# Patient Record
Sex: MALE | Race: WHITE | Employment: OTHER | URBAN - METROPOLITAN AREA
[De-identification: names, ages, dates, MRNs, and addresses within clinical notes are randomized per-mention and may not be internally consistent; named-entity substitution may affect disease eponyms.]

---

## 2018-04-05 ENCOUNTER — HOSPITAL ENCOUNTER (OUTPATIENT)
Dept: PHYSICAL THERAPY | Age: 38
Discharge: HOME | End: 2018-04-05
Payer: COMMERCIAL

## 2018-04-05 PROCEDURE — 97140 MANUAL THERAPY 1/> REGIONS: CPT

## 2018-04-05 PROCEDURE — 97161 PT EVAL LOW COMPLEX 20 MIN: CPT
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
## 2018-04-05 NOTE — PROGRESS NOTES
PHYSICAL THERAPY - DAILY TREATMENT NOTE    Patient Name: Verónica Maza        Date: 2018  : 1980   YES Patient  Verified  Visit #:     Insurance: Payor: Rocael Mendoza / Plan: Luis Lipscomb PT / Product Type: Commerical /      In time: 8:30 Out time: 9:20   Total Treatment Time: 50     Medicare Time Tracking (below)   Total Timed Codes (min):  - 1:1 Treatment Time:  -     TREATMENT AREA =  C/S    SUBJECTIVE    Pain Level (on 0 to 10 scale): 4 / 10   Medication Changes/New allergies or changes in medical history, any new surgeries or procedures? NO    If yes, update Summary List   Subjective Functional Status/Changes:  []  No changes reported      see eval          OBJECTIVE    Modality rationale:  -    min [] Estim, type:                                          []  att     []  unatt     []  w/US     []  w/ice    []  w/heat    min []  Mechanical Traction: type/lbs                                               []  pro   []  sup   []  int   []  cont    min []  Ultrasound, settings/location:      min []  Iontophoresis:  []  take home patch w/ dexamethazone    min                                []  in clinic w/ dexamethazone    min []  Ice     []  Heat     position:     min []  Other:      - min Therapeutic Exercise:  [x]  See flow sheet   []  Other:      []  Added:     to improve (function):    []  Changed:     to improve (function):       min Therapeutic Activity:      15 min Manual Therapy: Upper T/S L rotational mobs in sitting. GD IV PA mobs to upper T/S and lower C/S, passive sustained rotation, trap stretch and levaotr stretch, passive rep extension, DTM to arch      min Gait Training:       min Patient Education:  YES  Reviewed HEP   []  Progressed/Changed HEP based on:         Other Objective/Functional Measures:    See eval     Post Treatment Pain Level (on 0 to 10) scale:   1  / 10     ASSESSMENT    []  See Progress Note/Recertification   Patient will continue to benefit from skilled therapy to address remaining functional deficits: see eval   Progress toward goals / Updated goals:    -     PLAN    [x]  Upgrade activities as tolerated YES Continue plan of care   []  Discharge due to :    []  Other:      Therapist: Zenaida Peña PT    Date: 4/5/2018 Time: 9:48 AM

## 2018-04-10 ENCOUNTER — HOSPITAL ENCOUNTER (OUTPATIENT)
Dept: PHYSICAL THERAPY | Age: 38
Discharge: HOME | End: 2018-04-10
Payer: COMMERCIAL

## 2018-04-10 PROCEDURE — 97140 MANUAL THERAPY 1/> REGIONS: CPT

## 2018-04-10 PROCEDURE — 97110 THERAPEUTIC EXERCISES: CPT

## 2018-04-10 NOTE — PROGRESS NOTES
PHYSICAL THERAPY - DAILY TREATMENT NOTE    Patient Name: Mitchell Monreal        Date: 4/10/2018  : 1980    Patient  Verified: YES  Visit #:   2   of   12  Insurance: Payor: Elina Frank / Plan: [address] PT / Product Type: Commerical /      In time: 3:30 Out time: 4:35   Total Treatment Time: 65     Medicare Time Tracking (below)   Total Timed Codes (min):  - 1:1 Treatment Time:  -     TREATMENT AREA/ DIAGNOSIS = Neck pain [M54.2]  Back pain [M54.9]  Heel pain [M79.673]    SUBJECTIVE  Pain Level (on 0 to 10 scale):  4  / 10   Medication Changes/New allergies or changes in medical history, any new surgeries or procedures?     NO    If yes, update Summary List   Subjective Functional Status/Changes:  []  No changes reported     Functional improvement    Functional limitation I was really sore after the first visit      OBJECTIVE  Modalities Rationale:     decrease edema, decrease inflammation and decrease pain to improve patient's ability to perform ADLs without pain   min [] Estim, type/location:                                      []  att     []  unatt     []  w/US     []  w/ice    []  w/heat    min []  Mechanical Traction: type/lbs                   []  pro   []  sup   []  int   []  cont    []  before manual    []  after manual    min []  Ultrasound, settings/location:      min []  Iontophoresis w/ dexamethasone, location:                                               []  take home patch       []  in clinic   10 min [x]  Ice     []  Heat    location/position: R heel/neck    min []  Vasopneumatic Device, press/temp:     min []  Other:    [x] Skin assessment post-treatment (if applicable):    [x]  intact    [x]  redness- no adverse reaction     []redness  adverse reaction:        30 min Manual Therapy: DTM to C/S, PROM, GD IV PA mobs to C/S and T/S, DTM to Calf, passive calf stretch and CFM to achilles insertion    Rationale:      decrease pain, increase ROM and increase tissue extensibility to improve patient's ability to perform ADLs without pain    25 min Therapeutic Exercise:  [x]  See flow sheet   Rationale:      increase ROM and increase strength to improve the patients ability to perform ADLs without pain          min Patient Education:  Yes    [x] Reviewed HEP   []  Progressed/Changed HEP based on: Other Objective/Functional Measures:    Pt with upper T/s soreness after the las visit, added ice to post treatment   Post Treatment Pain Level (on 0 to 10) scale:   3  / 10     ASSESSMENT  Assessment/Changes in Function:     hypomobile T/S     []  See Progress Note/Recertification   Patient will continue to benefit from skilled PT services to modify and progress therapeutic interventions, address functional mobility deficits, address ROM deficits, address strength deficits, analyze and address soft tissue restrictions, analyze and cue movement patterns, analyze and modify body mechanics/ergonomics and assess and modify postural abnormalities to attain remaining goals.    Progress toward goals / Updated goals:    -     PLAN  [x]  Upgrade activities as tolerated YES Continue plan of care   []  Discharge due to :    []  Other:      Therapist: Janey Bejarano PT    Date: 4/10/2018 Time: 4:10 PM        Future Appointments  Date Time Provider Sánchez Rodriguez   4/12/2018 11:00 AM Janey Bejarano PT REHAB CENTER AT [facility]   4/17/2018 11:30 AM Janey Bejarano PT REHAB CENTER AT [facility]   4/19/2018 11:00 AM Janey Bejarano PT REHAB CENTER AT [facility]   4/24/2018 11:00 AM Janey Bejarano PT REHAB CENTER AT [facility]   4/26/2018 11:00 AM Janey Bejarano PT REHAB CENTER AT [facility]

## 2018-04-12 ENCOUNTER — HOSPITAL ENCOUNTER (OUTPATIENT)
Dept: PHYSICAL THERAPY | Age: 38
Discharge: HOME | End: 2018-04-12
Payer: COMMERCIAL

## 2018-04-12 PROCEDURE — 97110 THERAPEUTIC EXERCISES: CPT

## 2018-04-12 PROCEDURE — 97140 MANUAL THERAPY 1/> REGIONS: CPT

## 2018-04-12 NOTE — PROGRESS NOTES
PHYSICAL THERAPY - DAILY TREATMENT NOTE    Patient Name: Jose Angel Carrasco        Date: 2018  : 1980    Patient  Verified: YES  Visit #:   3   of   12  Insurance: Payor: Reyes Daunt / Plan: [address] [place] [address] Rd PT / Product Type: Commerical /      In time: 10:45 Out time: 11:30   Total Treatment Time: 45     Medicare Time Tracking (below)   Total Timed Codes (min):  - 1:1 Treatment Time:  -     TREATMENT AREA/ DIAGNOSIS = Neck pain [M54.2]  Back pain [M54.9]  Heel pain [M79.673]    SUBJECTIVE  Pain Level (on 0 to 10 scale):  2-3  / 10   Medication Changes/New allergies or changes in medical history, any new surgeries or procedures? NO    If yes, update Summary List   Subjective Functional Status/Changes:  []  No changes reported     Pt reports that his calf and foot are more sore today than his neck. Pt states he will be walking around a lot this weekend when he goes out of town. OBJECTIVE    25 min Manual Therapy: DTM to c/s and t/s musculature. PA mobs to t/s in prone. STM to gastroc/soleus complex and cross friction to achilles tendon. Rationale:      increase tissue extensibility and decrease trigger points to improve patient's ability to perform ADLs without pain      20 min Therapeutic Exercise:  [x]  See flow sheet   Rationale:      increase strength and improve coordination to improve the patients ability to perform ADLs without pain            min Patient Education:  Yes    [x] Reviewed HEP   []  Progressed/Changed HEP based on: Other Objective/Functional Measures:    Pt had no increase in pain during treatment session  Pt had no radicular pain today. Initiated shoulder extension with theraband   Post Treatment Pain Level (on 0 to 10) scale:   1-2  / 10     ASSESSMENT  Assessment/Changes in Function:     Pt showing progress based on improved mobility in his thoracic spine post treatment. Pt still having pain in his heel with walking.       []  See Progress Note/Recertification Patient will continue to benefit from skilled PT services to modify and progress therapeutic interventions, address functional mobility deficits, address strength deficits, analyze and address soft tissue restrictions, analyze and cue movement patterns, analyze and modify body mechanics/ergonomics and assess and modify postural abnormalities to attain remaining goals. Progress toward goals / Updated goals:    Continue with current treatment plan progressing as the patient can tolerate.       PLAN  [x]  Upgrade activities as tolerated YES Continue plan of care   []  Discharge due to :    []  Other:      Therapist: Leland Small    Date: 4/12/2018 Time: 11:27 AM        Future Appointments  Date Time Provider Sánchez Rodriguez   4/17/2018 11:30 AM Susie Voss PT REHAB CENTER AT [facility]   4/19/2018 11:00 AM Susie Voss PT REHAB CENTER AT [facility]   4/24/2018 11:00 AM Susie Voss PT REHAB CENTER AT [facility]   4/26/2018 11:00 AM Susie Voss PT REHAB CENTER AT [facility]

## 2018-04-17 ENCOUNTER — HOSPITAL ENCOUNTER (OUTPATIENT)
Dept: PHYSICAL THERAPY | Age: 38
Discharge: HOME | End: 2018-04-17
Payer: COMMERCIAL

## 2018-04-17 PROCEDURE — 97110 THERAPEUTIC EXERCISES: CPT

## 2018-04-17 PROCEDURE — 97140 MANUAL THERAPY 1/> REGIONS: CPT

## 2018-04-17 NOTE — PROGRESS NOTES
PHYSICAL THERAPY - DAILY TREATMENT NOTE    Patient Name: Asia Calderón        Date: 2018  : 1980    Patient  Verified: YES  Visit #:      12  Insurance: Payor: Criselda Belle / Plan: [address] [place] [address] [facility] / Product Type: Commerical /      In time: 11:25 Out time: 12:10   Total Treatment Time: 45     Medicare Time Tracking (below)   Total Timed Codes (min):  - 1:1 Treatment Time:  -     TREATMENT AREA/ DIAGNOSIS = Neck pain [M54.2]  Back pain [M54.9]  Heel pain [M79.673]    SUBJECTIVE  Pain Level (on 0 to 10 scale):  0  / 10   Medication Changes/New allergies or changes in medical history, any new surgeries or procedures? NO    If yes, update Summary List   Subjective Functional Status/Changes:  []  No changes reported     Pt reports that he is feeling much better and didn't think he would make this much progress this fast.      OBJECTIVE    15 min Manual Therapy: DTM to U/T, rhomboids, c/s musculature. DTM to R gastroc/soleus complex   Rationale:      increase tissue extensibility and decrease trigger points to improve patient's ability to perform ADLs without pain      30 min Therapeutic Exercise:  [x]  See flow sheet   Rationale:      increase strength, improve coordination and increase proprioception to improve the patients ability to perform ADLs without pain          min Patient Education:  Yes    [x] Reviewed HEP   []  Progressed/Changed HEP based on: Other Objective/Functional Measures:    Pt had no increase in pain during treatment session  Pt has full ROM of C/S     Post Treatment Pain Level (on 0 to 10) scale:   0  / 10     ASSESSMENT  Assessment/Changes in Function:     Pt is showing great improvement based on achieving full ROM and decrease pain with activities.  Pt still has some soft tissue restrictions in the R U/T but are not causing symptoms     []  See Progress Note/Recertification   Patient will continue to benefit from skilled PT services to modify and progress therapeutic interventions, address functional mobility deficits, address ROM deficits, address strength deficits, analyze and address soft tissue restrictions, analyze and cue movement patterns, analyze and modify body mechanics/ergonomics and assess and modify postural abnormalities to attain remaining goals. Progress toward goals / Updated goals:    Continue with current treatment plan and progress as tolerated.       PLAN  [x]  Upgrade activities as tolerated YES Continue plan of care   []  Discharge due to :    []  Other:      Therapist: Mariano Rosales    Date: 4/17/2018 Time: 1:45 PM        Future Appointments  Date Time Provider Sánchez Rodriguez   4/19/2018 11:00 AM David Soto PT REHAB CENTER AT [facility]   4/24/2018 11:00 AM David Soto PT REHAB CENTER AT [facility]   4/26/2018 11:00 AM David Soto PT REHAB CENTER AT [facility]

## 2018-04-19 ENCOUNTER — HOSPITAL ENCOUNTER (OUTPATIENT)
Dept: PHYSICAL THERAPY | Age: 38
Discharge: HOME | End: 2018-04-19
Payer: COMMERCIAL

## 2018-04-19 PROCEDURE — 97110 THERAPEUTIC EXERCISES: CPT

## 2018-04-19 PROCEDURE — 97140 MANUAL THERAPY 1/> REGIONS: CPT

## 2018-04-19 NOTE — PROGRESS NOTES
PHYSICAL THERAPY - DAILY TREATMENT NOTE    Patient Name: Ria Cerda        Date: 2018  : 1980    Patient  Verified: YES  Visit #:   5   of   12  Insurance: Payor: Levi Junior / [facility]: [address] [address] [facility] / Product Type: Commerical /      In time: 11:00 Out time: 11:55   Total Treatment Time: 55     Medicare Time Tracking (below)   Total Timed Codes (min):  - 1:1 Treatment Time:  -     TREATMENT AREA/ DIAGNOSIS = Neck pain [M54.2]  Back pain [M54.9]  Heel pain [M79.673]    SUBJECTIVE  Pain Level (on 0 to 10 scale):  2  / 10   Medication Changes/New allergies or changes in medical history, any new surgeries or procedures? NO    If yes, update Summary List   Subjective Functional Status/Changes:  []  No changes reported     Pt states that there isnt much change since last time. He is a little sore in his neck and calves. OBJECTIVE  Modalities Rationale:     decrease inflammation and decrease pain to improve patient's ability to perform ADLs without pain     min [] Estim, type/location:                                      []  att     []  unatt     []  w/US     []  w/ice    []  w/heat    min []  Mechanical Traction: type/lbs                   []  pro   []  sup   []  int   []  cont    []  before manual    []  after manual    min []  Ultrasound, settings/location:      min []  Iontophoresis w/ dexamethasone, location:                                               []  take home patch       []  in clinic   10 min [x]  Ice     []  Heat    location/position:     min []  Vasopneumatic Device, press/temp:     min []  Other:    [] Skin assessment post-treatment (if applicable):    []  intact    []  redness- no adverse reaction     []redness  adverse reaction:        25 min Manual Therapy: DTM to c/s musculature. PA mobs in prone to the thoracic spine.  DTM to gastroc/soleus complex   Rationale:      decrease pain, increase tissue extensibility and decrease trigger points to improve patient's ability to perform ADLs without pain      20 min Therapeutic Exercise:  [x]  See flow sheet   Rationale:      increase strength, improve coordination and increase proprioception to improve the patients ability to perform ADLs without pain            min Patient Education:  Yes    [x] Reviewed HEP   []  Progressed/Changed HEP based on: Other Objective/Functional Measures:    Pt had no increase in pain during treatment session  Pt had full ROM in the c/s post treatment   Initiated theraband horizontal abduction   Post Treatment Pain Level (on 0 to 10) scale:   0  / 10     ASSESSMENT  Assessment/Changes in Function:     Pt benefited from manual therapy based on improved tissue mobility post treatment. Pt still shows some decreases scapular stability based on anterior tilting of the scapula during exercises. []  See Progress Note/Recertification   Patient will continue to benefit from skilled PT services to modify and progress therapeutic interventions, address functional mobility deficits, address ROM deficits, address strength deficits, analyze and address soft tissue restrictions, analyze and cue movement patterns and analyze and modify body mechanics/ergonomics to attain remaining goals. Progress toward goals / Updated goals:    Continue with current treatment and progress as tolerate.       PLAN  [x]  Upgrade activities as tolerated YES Continue plan of care   []  Discharge due to :    []  Other:      Therapist: Devin Bowie    Date: 4/19/2018 Time: 11:52 AM        Future Appointments  Date Time Provider Sánchez Rodriguez   4/24/2018 11:00 AM Lucila Cadet PT REHAB CENTER AT [facility]   4/26/2018 11:00 AM Lucila Cadet PT REHAB CENTER AT [facility]

## 2018-04-24 ENCOUNTER — HOSPITAL ENCOUNTER (OUTPATIENT)
Dept: PHYSICAL THERAPY | Age: 38
Discharge: HOME | End: 2018-04-24
Payer: COMMERCIAL

## 2018-04-24 PROCEDURE — 97140 MANUAL THERAPY 1/> REGIONS: CPT

## 2018-04-24 PROCEDURE — 97110 THERAPEUTIC EXERCISES: CPT

## 2018-04-24 NOTE — PROGRESS NOTES
PHYSICAL THERAPY - DAILY TREATMENT NOTE    Patient Name: Herman Dodge        Date: 2018  : 1980    Patient  Verified: YES  Visit #:   6   of   12  Insurance: Payor: Karla Reyes / Plan:  [address] PT / Product Type: Commerical /      In time: 11 Out time: 11:40   Total Treatment Time: 40     Medicare Time Tracking (below)   Total Timed Codes (min):  - 1:1 Treatment Time:  -     TREATMENT AREA/ DIAGNOSIS = Neck pain [M54.2]  Back pain [M54.9]  Heel pain [M79.673]    SUBJECTIVE  Pain Level (on 0 to 10 scale):  3  / 10   Medication Changes/New allergies or changes in medical history, any new surgeries or procedures? NO    If yes, update Summary List   Subjective Functional Status/Changes:  []  No changes reported     Functional improvement my neck is getting better   Functional limitation my heel still hurts about the same      OBJECTIVE  Modalities Rationale:     decrease edema, decrease inflammation and decrease pain to improve patient's ability to perform ADLs without pain   min [] Estim, type/location:                                      []  att     []  unatt     []  w/US     []  w/ice    []  w/heat    min []  Mechanical Traction: type/lbs                   []  pro   []  sup   []  int   []  cont    []  before manual    []  after manual    min []  Ultrasound, settings/location:      min []  Iontophoresis w/ dexamethasone, location:                                               []  take home patch       []  in clinic   10 min [x]  Ice     []  Heat    location/position:     min []  Vasopneumatic Device, press/temp:     min []  Other:    [x] Skin assessment post-treatment (if applicable):    [x]  intact    [x]  redness- no adverse reaction     []redness  adverse reaction:        15 min Manual Therapy: DTM to C/S, GD IV PA mos to upper T/S and lower C/S, PROM, rep ext. CFM to achilles insertion R.     Rationale:      decrease pain, increase ROM and increase tissue extensibility to improve patient's ability to perform ADLs without pain    15 min Therapeutic Exercise:  [x]  See flow sheet   Rationale:      increase ROM and increase strength to improve the patients ability to perform ADLs without pain          min Patient Education:  Yes    [x] Reviewed HEP   []  Progressed/Changed HEP based on: Other Objective/Functional Measures:    Pt with improving neck pain  Tenderness with PA mobs to C7 region  continued   Post Treatment Pain Level (on 0 to 10) scale:   0  / 10     ASSESSMENT  Assessment/Changes in Function:     Less neck pian and full ROM     []  See Progress Note/Recertification   Patient will continue to benefit from skilled PT services to modify and progress therapeutic interventions, address functional mobility deficits, address ROM deficits, address strength deficits, analyze and address soft tissue restrictions, analyze and cue movement patterns, analyze and modify body mechanics/ergonomics and assess and modify postural abnormalities to attain remaining goals.    Progress toward goals / Updated goals:    Improved neck ROM and low pain levels     PLAN  [x]  Upgrade activities as tolerated YES Continue plan of care   []  Discharge due to :    []  Other:      Therapist: Pamela Amin PT    Date: 4/24/2018 Time: 2:49 PM        Future Appointments  Date Time Provider Sánchez Rodriguez   4/26/2018 11:00 AM Pamela Amin, PT REHAB CENTER AT [facility]

## 2018-04-26 ENCOUNTER — HOSPITAL ENCOUNTER (OUTPATIENT)
Dept: PHYSICAL THERAPY | Age: 38
Discharge: HOME | End: 2018-04-26
Payer: COMMERCIAL

## 2018-04-26 PROCEDURE — 97110 THERAPEUTIC EXERCISES: CPT

## 2018-04-26 PROCEDURE — 97140 MANUAL THERAPY 1/> REGIONS: CPT

## 2018-04-26 NOTE — PROGRESS NOTES
PHYSICAL THERAPY - DAILY TREATMENT NOTE    Patient Name: Lorenzo Findhannah        Date: 2018  : 1980    Patient  Verified: YES  Visit #:      of   12  Insurance: Payor: [employer] / Plan: [address] PT / Product Type: Commerical /      In time: 11:10 Out time: 11:55   Total Treatment Time: 45     Medicare Time Tracking (below)   Total Timed Codes (min):  - 1:1 Treatment Time:  -     TREATMENT AREA/ DIAGNOSIS = Neck pain [M54.2]  Back pain [M54.9]  Heel pain [M79.673]    SUBJECTIVE  Pain Level (on 0 to 10 scale):  2  / 10   Medication Changes/New allergies or changes in medical history, any new surgeries or procedures? NO    If yes, update Summary List   Subjective Functional Status/Changes:  []  No changes reported     Functional improvement the neck is feeling better   Functional limitation still some heel pain      OBJECTIVE  Modalities Rationale:     decrease edema, decrease inflammation and decrease pain to improve patient's ability to perform ADLs without pain   min [] Estim, type/location:                                      []  att     []  unatt     []  w/US     []  w/ice    []  w/heat    min []  Mechanical Traction: type/lbs                   []  pro   []  sup   []  int   []  cont    []  before manual    []  after manual    min []  Ultrasound, settings/location:      min []  Iontophoresis w/ dexamethasone, location:                                               []  take home patch       []  in clinic   10 min [x]  Ice     []  Heat    location/position: R heel and C/S    min []  Vasopneumatic Device, press/temp:     min []  Other:    [x] Skin assessment post-treatment (if applicable):    [x]  intact    [x]  redness- no adverse reaction     []redness  adverse reaction:        15 min Manual Therapy: DTM to C/S, GD IV PA mobs to upper T/S and C/S, B trap stretch, passive sustained rotation, passive rep extension.   CFM to achilles, passive calf stretch and AP mobs to TC joint    Rationale: decrease pain, increase ROM and increase tissue extensibility to improve patient's ability to perform ADLs without pain    20 min Therapeutic Exercise:  [x]  See flow sheet   Rationale:      increase ROM and increase strength to improve the patients ability to perform ADLs without pain          min Patient Education:  Yes    [x] Reviewed HEP   []  Progressed/Changed HEP based on: Other Objective/Functional Measures:    Pt with limited C/S pain, tender in his C/S T/S junction  Full PROM   in achilles insertion  Still jamming in ant R TC joint with passive soleus stretch   Post Treatment Pain Level (on 0 to 10) scale:   0  / 10     ASSESSMENT  Assessment/Changes in Function:     Improved C/S ROM and less pain   []  See Progress Note/Recertification   Patient will continue to benefit from skilled PT services to modify and progress therapeutic interventions, address functional mobility deficits, address ROM deficits, address strength deficits, analyze and address soft tissue restrictions, analyze and cue movement patterns, analyze and modify body mechanics/ergonomics and assess and modify postural abnormalities to attain remaining goals. Progress toward goals / Updated goals:    Less pain     PLAN  [x]  Upgrade activities as tolerated YES Continue plan of care   []  Discharge due to :    []  Other:      Therapist: Zenaida Peña PT    Date: 4/26/2018 Time: 12:03 PM        No future appointments.

## 2018-05-03 ENCOUNTER — HOSPITAL ENCOUNTER (OUTPATIENT)
Dept: PHYSICAL THERAPY | Age: 38
Discharge: HOME | End: 2018-05-03
Payer: COMMERCIAL

## 2018-05-03 PROCEDURE — 97110 THERAPEUTIC EXERCISES: CPT

## 2018-05-03 PROCEDURE — 97140 MANUAL THERAPY 1/> REGIONS: CPT

## 2018-05-03 NOTE — PROGRESS NOTES
[address]  [facility] [address] [facility], [address] [place] [address], [facility], [address] [place] [address]  Phone: [phone]  Fax: [phone]  PROGRESS NOTE  Patient Name: Thao Marshall : 1980   Treatment/Medical Diagnosis: Neck pain [M54.2]  Back pain [M54.9]  Heel pain [T96.803]   Referral Source: Alphia Mcburney, DO     Date of Initial Visit: 18 Attended Visits: 8 Missed Visits: 0     SUMMARY OF TREATMENT  Manual therapy, including massage, joint mobs and PROM to C/S.  CFM to achilles. Therex to promote good posture and C/S stability. Patient education on posture and HEP. CURRENT STATUS  The patient is improving with his neck pain. His AROM is now pain free and has improved. He is still having achilles insertion pain, but a little less. Goal/Measure of Progress Goal Met? 1. Increase C/S ext to 70 degrees   Status at last Eval: 50 degrees  Current Status:  58 degrees progressing   2. Pain free L/R rotation   Status at last Eval: Pain with L rotation Current Status: Pain free rotation L and R yes   3. Pain free ambulation (heel pain) for > 30 minutes   Status at last Eval: Moderate pain with walking Current Status: Mild pain with walking, improves with distance progressing   4. Pt to report GROC of >= +4, to indicate increased function   Status at last Eval: - Current Status: +3, Somewhat better progressing     New Goals to be achieved in __4-5__  weeks:  1. Pain free C/S with ADLs   2. Pt to report GROC of >= +4, to indicate increased function   3. Pain free ambulation (heel)   4. Increase C/S ext to > 70 degrees without pain      RECOMMENDATIONS  Continue PT 2x/week x 4-5 weeks  If you have any questions/comments please contact us directly at [phone]. Thank you for allowing us to assist in the care of your patient. Therapist Signature:  Mitchel Norman PT, MDT Date: 5/3/2018     Time: 5:48 PM   NOTE TO PHYSICIAN:  PLEASE COMPLETE THE ORDERS BELOW AND FAX TO   [facility] [place]: [phone]. If you are unable to process this request in 24 hours please contact our office: [phone].    ___ I have read the above report and request that my patient continue as recommended.   ___ I have read the above report and request that my patient continue therapy with the following changes/special instructions:_________________________________________________________   ___ I have read the above report and request that my patient be discharged from therapy.      Physician Signature:        Date:       Time:

## 2018-05-03 NOTE — PROGRESS NOTES
PHYSICAL THERAPY - DAILY TREATMENT NOTE    Patient Name: Lise Loza        Date: 5/3/2018  : 1980    Patient  Verified: YES  Visit #:     Insurance: Payor: Pavan Brooks / Plan:  [facility] / Product Type: Commerical /      In time: 3:40 Out time: 4:20   Total Treatment Time: 40     Medicare Time Tracking (below)   Total Timed Codes (min):  - 1:1 Treatment Time:  -     TREATMENT AREA/ DIAGNOSIS = Neck pain [M54.2]  Back pain [M54.9]  Heel pain [M79.673]    SUBJECTIVE  Pain Level (on 0 to 10 scale):  2  / 10   Medication Changes/New allergies or changes in medical history, any new surgeries or procedures? NO    If yes, update Summary List   Subjective Functional Status/Changes:  []  No changes reported     Functional improvement getting better   Functional limitation         20 min Manual Therapy: DTM to C/S, GD IV PA mobs to upepr t/S and lower C/S, PROM, CFM to R achilles    Rationale:      decrease pain, increase ROM and increase tissue extensibility to improve patient's ability to perform ADLs without pain    20 min Therapeutic Exercise:  [x]  See flow sheet   Rationale:      increase ROM, increase strength and improve balance to improve the patients ability to perform ADLs without pain          min Patient Education:  Yes    [x] Reviewed HEP   []  Progressed/Changed HEP based on: Other Objective/Functional Measures:    See PN   Post Treatment Pain Level (on 0 to 10) scale:   0  / 10     ASSESSMENT  Assessment/Changes in Function:     See PN     []  See Progress Note/Recertification   Patient will continue to benefit from skilled PT services to modify and progress therapeutic interventions, address functional mobility deficits, address ROM deficits, address strength deficits, analyze and address soft tissue restrictions, analyze and cue movement patterns, analyze and modify body mechanics/ergonomics and assess and modify postural abnormalities to attain remaining goals. Progress toward goals / Updated goals:    See PN     PLAN  [x]  Upgrade activities as tolerated YES Continue plan of care   []  Discharge due to :    []  Other:      Therapist: Salvador Jang PT    Date: 5/3/2018 Time: 5:46 PM        Future Appointments  Date Time Provider Sánchez Rodriguez   5/8/2018 11:30 AM Salvador Jang PT [facility] AT [address]   5/10/2018 4:00 PM Salvador Jang PT [facility] AT [address]   5/15/2018 5:00 PM Salvador Jang PT [facility] AT [address]   5/17/2018 3:00 PM Salvador Jang PT [facility] AT [address]   5/22/2018 9:00 AM Salvador Jang PT [facility] AT [address]   5/24/2018 3:00 PM Salvador Jang PT RE[facility] AT [address]   5/29/2018 9:00 AM Salvador Jang PT RE[facility] AT [address]   5/31/2018 3:30 PM Salvador Jang, PT REHAB CENTER AT [address]

## 2018-05-08 ENCOUNTER — HOSPITAL ENCOUNTER (OUTPATIENT)
Dept: PHYSICAL THERAPY | Age: 38
Discharge: HOME | End: 2018-05-08
Payer: COMMERCIAL

## 2018-05-08 PROCEDURE — 97110 THERAPEUTIC EXERCISES: CPT

## 2018-05-08 PROCEDURE — 97140 MANUAL THERAPY 1/> REGIONS: CPT

## 2018-05-08 NOTE — PROGRESS NOTES
PHYSICAL THERAPY - DAILY TREATMENT NOTE    Patient Name: Cornelio Latham        Date: 2018  : 1980    Patient  Verified: YES  Visit #:      16  Insurance: Payor: Dennys Watkins / Plan: [address] [place] [address] [facility] / Product Type: Commerical /      In time: 11:35 Out time: 12:05   Total Treatment Time: 30     Medicare Time Tracking (below)   Total Timed Codes (min):  - 1:1 Treatment Time:  -     TREATMENT AREA/ DIAGNOSIS = Neck pain [M54.2]  Back pain [M54.9]  Heel pain [M79.673]    SUBJECTIVE  Pain Level (on 0 to 10 scale):  1  / 10   Medication Changes/New allergies or changes in medical history, any new surgeries or procedures? NO    If yes, update Summary List   Subjective Functional Status/Changes:  []  No changes reported     Functional improvement the pain is getting less   Functional limitation         20 min Manual Therapy: DTM to C/S, GD IV PA mobs to lower C?S and upper T/S, passive sustained rotation and passive rep extension   Rationale:      decrease pain, increase ROM and increase tissue extensibility to improve patient's ability to perform ADLs without pain    10 min Therapeutic Exercise:  [x]  See flow sheet   Rationale:      increase ROM and increase strength to improve the patients ability to perform ADLs without pain          min Patient Education:  Yes    [x] Reviewed HEP   []  Progressed/Changed HEP based on:         Other Objective/Functional Measures:    Pt with less and less neck pain  improving ROM    Post Treatment Pain Level (on 0 to 10) scale:   0  / 10     ASSESSMENT  Assessment/Changes in Function:     No pain after treatment     []  See Progress Note/Recertification   Patient will continue to benefit from skilled PT services to modify and progress therapeutic interventions, address functional mobility deficits, address ROM deficits, address strength deficits, analyze and address soft tissue restrictions, analyze and cue movement patterns, analyze and modify body mechanics/ergonomics and assess and modify postural abnormalities to attain remaining goals.    Progress toward goals / Updated goals:    Pt with full PROM and low pain levels     PLAN  [x]  Upgrade activities as tolerated YES Continue plan of care   []  Discharge due to :    []  Other:      Therapist: Olivia Fernandez PT    Date: 5/8/2018 Time: 12:30 PM        Future Appointments  Date Time Provider Sánchez Rodriguez   5/10/2018 4:00 PM Olivia Fernandez PT [facility] AT [facility]   5/15/2018 5:00 PM Olivia Fernandez, PT [facility] AT [facility]   5/17/2018 3:00 PM Olivia Fernandez, PT [facility] AT [facility]   5/22/2018 9:00 AM Olivia Fernandez, PT [facility] AT [facility]   5/24/2018 3:00 PM Olivia Fernandez, PT [facility] AT [facility]   5/29/2018 9:00 AM Olivia Fernandez, PT [facility] AT [facility]   5/31/2018 3:30 PM Olivia Fernandez, PT [facility] AT [facility]

## 2018-05-10 ENCOUNTER — HOSPITAL ENCOUNTER (OUTPATIENT)
Dept: PHYSICAL THERAPY | Age: 38
Discharge: HOME | End: 2018-05-10
Payer: COMMERCIAL

## 2018-05-10 PROCEDURE — 97140 MANUAL THERAPY 1/> REGIONS: CPT

## 2018-05-10 PROCEDURE — 97110 THERAPEUTIC EXERCISES: CPT

## 2018-05-10 NOTE — PROGRESS NOTES
PHYSICAL THERAPY - DAILY TREATMENT NOTE    Patient Name: Norman Finch        Date: 5/10/2018  : 1980    Patient  Verified: YES  Visit #:   10   of   16  Insurance: Payor: Hunter Bardales / Plan: [address] [place] [address] [facility] / Product Type: Commerical /      In time: 4:05 Out time: 4:40   Total Treatment Time: 35     Medicare Time Tracking (below)   Total Timed Codes (min):  - 1:1 Treatment Time:  -     TREATMENT AREA/ DIAGNOSIS = Neck pain [M54.2]  Back pain [M54.9]  Heel pain [M79.673]    SUBJECTIVE  Pain Level (on 0 to 10 scale):  0  / 10   Medication Changes/New allergies or changes in medical history, any new surgeries or procedures? NO    If yes, update Summary List   Subjective Functional Status/Changes:  []  No changes reported     Primary c/o; tightness with prolonged computer work. 20 min Manual Therapy: DTM to C/S, GD IV PA mobs to lower C/S and upper T/S, passive sustained rotation and UT stretching. Rationale:      decrease pain, increase ROM and increase tissue extensibility to improve patient's ability to perform ADLs without pain    15 min Therapeutic Exercise:  [x]  See flow sheet   Rationale:      increase ROM and increase strength to improve the patients ability to perform ADLs without pain       X min Patient Education:  Yes    [x] Reviewed HEP   []  Progressed/Changed HEP based on: Other Objective/Functional Measures:    Full cervical PROM     Post Treatment Pain Level (on 0 to 10) scale:   0  / 10     ASSESSMENT  Assessment/Changes in Function:     Cont to address postural deficits with instruction in computer ergonomics.       []  See Progress Note/Recertification   Patient will continue to benefit from skilled PT services to modify and progress therapeutic interventions, address functional mobility deficits, address ROM deficits, address strength deficits, analyze and address soft tissue restrictions, analyze and cue movement patterns, analyze and modify body mechanics/ergonomics and assess and modify postural abnormalities to attain remaining goals.    Progress toward goals / Updated goals:    Pt with full PROM and low pain levels     PLAN  [x]  Upgrade activities as tolerated YES Continue plan of care   []  Discharge due to :    []  Other:      Therapist: Annabelle Moore PTA    Date: 5/10/2018 Time: 12:30 PM        Future Appointments  Date Time Provider Sánchez Rodriguez   5/10/2018 4:00 PM Samuel GARCIA PTA [facility] AT [facility]   5/15/2018 5:00 PM Imelda Sanders, PT RE[facility] AT [facility]   5/17/2018 3:00 PM Imelda Sanders, PT [facility] AT [facility]   5/22/2018 9:00 AM Imelda Sanders, PT RE[facility] AT [facility]   5/24/2018 3:00 PM Imelda Sanders, PT RE[facility] AT [facility]   5/29/2018 9:00 AM Imelda Sanders, PT RE[facility] AT [facility]   5/31/2018 3:30 PM Imelda Sanders, PT RE[facility] AT [facility]

## 2018-05-15 ENCOUNTER — HOSPITAL ENCOUNTER (OUTPATIENT)
Dept: PHYSICAL THERAPY | Age: 38
End: 2018-05-15
Payer: COMMERCIAL

## 2018-05-17 ENCOUNTER — APPOINTMENT (OUTPATIENT)
Dept: PHYSICAL THERAPY | Age: 38
End: 2018-05-17
Payer: COMMERCIAL

## 2018-05-22 ENCOUNTER — APPOINTMENT (OUTPATIENT)
Dept: PHYSICAL THERAPY | Age: 38
End: 2018-05-22
Payer: COMMERCIAL

## 2018-05-24 ENCOUNTER — APPOINTMENT (OUTPATIENT)
Dept: PHYSICAL THERAPY | Age: 38
End: 2018-05-24
Payer: COMMERCIAL

## 2018-05-29 ENCOUNTER — APPOINTMENT (OUTPATIENT)
Dept: PHYSICAL THERAPY | Age: 38
End: 2018-05-29
Payer: COMMERCIAL

## 2018-05-31 ENCOUNTER — APPOINTMENT (OUTPATIENT)
Dept: PHYSICAL THERAPY | Age: 38
End: 2018-05-31
Payer: COMMERCIAL

## 2018-06-13 NOTE — PROGRESS NOTES
[address]  [facility] PHYSICAL THERAPY AT [address] [facility], [address] [place] [address], [address] [facility], [address] [place] [address]  Phone: [phone]  Fax: [phone]  DISCHARGE NOTE  Patient Name: Jovita Garibay : 1980   Treatment/Medical Diagnosis: Neck pain [M54.2]  Back pain [M54.9]  Heel pain [V98.219]   Referral Source: Jalyn Perez DO     Date of Initial Visit: 18 Attended Visits: 10 Missed Visits: 1     SUMMARY OF TREATMENT  Manual therapy, including massage, joint mobs and PROM to C/S.  CFM to achilles. Therex to promote good posture and C/S stability. Patient education on posture and HEP. CURRENT STATUS  The patient has not returned to therapy since his 5/10 visit and has been discharged. At that last visit, he was pain free in his neck and achilles. Goal/Measure of Progress Goal Met? 1. Increase C/S ext to 70 degrees   Status at last Eval: 50 degrees  Current Status:  58 degrees progressing   2. Pain free L/R rotation   Status at last Eval: Pain with L rotation Current Status: Pain free rotation L and R yes   3. Pain free ambulation (heel pain) for > 30 minutes   Status at last Eval: Moderate pain with walking Current Status: Mild pain with walking, improves with distance progressing   4. Pt to report GROC of >= +4, to indicate increased function   Status at last Eval: - Current Status: +3, Somewhat better progressing   RECOMMENDATIONS  DC patient due to non attendance. Pt is independent with HEP. If you have any questions/comments please contact us directly at [phone]. Thank you for allowing us to assist in the care of your patient. Therapist Signature:  Pierre Mcnair PT, MDT Date: 2018     Time: 5:48 PM